# Patient Record
Sex: MALE | Race: BLACK OR AFRICAN AMERICAN | NOT HISPANIC OR LATINO | URBAN - METROPOLITAN AREA
[De-identification: names, ages, dates, MRNs, and addresses within clinical notes are randomized per-mention and may not be internally consistent; named-entity substitution may affect disease eponyms.]

---

## 2019-01-27 ENCOUNTER — EMERGENCY (EMERGENCY)
Facility: HOSPITAL | Age: 59
LOS: 1 days | Discharge: HOME | End: 2019-01-27
Attending: EMERGENCY MEDICINE | Admitting: EMERGENCY MEDICINE
Payer: MEDICAID

## 2019-01-27 VITALS — DIASTOLIC BLOOD PRESSURE: 112 MMHG | SYSTOLIC BLOOD PRESSURE: 218 MMHG

## 2019-01-27 VITALS
RESPIRATION RATE: 14 BRPM | HEART RATE: 71 BPM | SYSTOLIC BLOOD PRESSURE: 188 MMHG | DIASTOLIC BLOOD PRESSURE: 134 MMHG | OXYGEN SATURATION: 99 %

## 2019-01-27 PROCEDURE — 99283 EMERGENCY DEPT VISIT LOW MDM: CPT

## 2019-01-27 RX ORDER — AMLODIPINE BESYLATE 2.5 MG/1
10 TABLET ORAL ONCE
Qty: 0 | Refills: 0 | Status: COMPLETED | OUTPATIENT
Start: 2019-01-27 | End: 2019-01-27

## 2019-01-27 RX ORDER — AMLODIPINE BESYLATE 2.5 MG/1
1 TABLET ORAL
Qty: 7 | Refills: 0 | OUTPATIENT
Start: 2019-01-27 | End: 2019-02-02

## 2019-01-27 RX ORDER — POLYMYXIN B SULF/TRIMETHOPRIM 10000-1/ML
1 DROPS OPHTHALMIC (EYE) ONCE
Qty: 0 | Refills: 0 | Status: COMPLETED | OUTPATIENT
Start: 2019-01-27 | End: 2019-01-27

## 2019-01-27 RX ORDER — TETANUS TOXOID, REDUCED DIPHTHERIA TOXOID AND ACELLULAR PERTUSSIS VACCINE, ADSORBED 5; 2.5; 8; 8; 2.5 [IU]/.5ML; [IU]/.5ML; UG/.5ML; UG/.5ML; UG/.5ML
0.5 SUSPENSION INTRAMUSCULAR ONCE
Qty: 0 | Refills: 0 | Status: COMPLETED | OUTPATIENT
Start: 2019-01-27 | End: 2019-01-27

## 2019-01-27 RX ADMIN — AMLODIPINE BESYLATE 10 MILLIGRAM(S): 2.5 TABLET ORAL at 21:40

## 2019-01-27 RX ADMIN — TETANUS TOXOID, REDUCED DIPHTHERIA TOXOID AND ACELLULAR PERTUSSIS VACCINE, ADSORBED 0.5 MILLILITER(S): 5; 2.5; 8; 8; 2.5 SUSPENSION INTRAMUSCULAR at 21:06

## 2019-01-27 RX ADMIN — Medication 1 DROP(S): at 21:09

## 2019-01-27 NOTE — ED ADULT TRIAGE NOTE - CHIEF COMPLAINT QUOTE
Pt presents with c/o right eye discomfort, "it feels like gravels is inside". States he woke up with his symptom and denies any known exposure. Pt attempted to irrigate with eye wash without improvement to symptoms. Denies alteration to vision. pt did not take his antihypertensive medications today

## 2019-01-27 NOTE — ED PROVIDER NOTE - OBJECTIVE STATEMENT
59 y/o male with pmhx of HTN for past 2 years on no medications presents to ED c/o right eye redness, eyelid swelling and foreign body sensation since this morning. States he woke up this morning like he felt gravel was in his eye. Admits to mild photophobia. Bought eye wash saline at pharmacy with no relief. Has no PCP. Does not wear glasses. Denies fever, chills, vision changes, cp, sob, palpitations. 57 y/o male with pmhx of HTN for past 2 years on no medications presents to ED c/o right eye redness, minimal eyelid swelling and foreign body sensation since this morning. States he woke up this morning like he felt gravel was in his eye. Admits to mild photophobia. Bought eye wash saline at pharmacy with no relief. Has no PCP. Does not wear glasses. Denies fever, chills, vision changes, cp, sob, palpitations.

## 2019-01-27 NOTE — ED ADULT NURSE NOTE - OBJECTIVE STATEMENT
Pt received in room 13, a/o x4. Pt comes in for c/o right eye discomfort and continuous running water since earlier this evening. Pt reports that he felt something go in his eye and since then it has been running water. Pt went to pharmacy for eye solution and has had no relief, now here for evaluation. Pt appears in no acute distress, vs as noted and pt appears uncomfortable. Will monitor and await further orders.

## 2019-01-27 NOTE — ED PROVIDER NOTE - PROGRESS NOTE DETAILS
repeat blood pressure 220 systolic pt admits to feeling scared after receiving adacel shot. denies cp, sob, headache, weakness, numbness. will start pt on amlodipine for 2 weeks and close pcp follow up pt amenable with plan repeat blood pressure 220 systolic pt admits to feeling scared after receiving adacel shot. denies cp, sob, headache, weakness, numbness. will start pt on amlodipine for 1 week   and close pcp follow up pt amenable with plan repeat blood pressure 220 systolic pt admits to feeling scared after receiving adacel shot. repeat 215 systolic. denies cp, sob, headache, weakness, numbness. will start pt on amlodipine for 1 week   and close pcp follow up pt amenable with plan repeat blood pressure 220 systolic pt admits to feeling scared after receiving adacel shot. repeat 215 systolic. denies cp, sob, headache, weakness, numbness. will start pt on amlodipine for 1 week   and close pcp follow up pt amenable with plan discussed with Dr. Cee

## 2019-01-27 NOTE — ED PROVIDER NOTE - NSFOLLOWUPINSTRUCTIONS_ED_ALL_ED_FT
Follow up with Opthomology- call [phone]. Use Polytrim eye drops in right eye every 3 hours for 10 days. Use erythromycin ointment 1 application once daily at bedtime.     Follow up with Primary Care for uncontrolled high blood pressure.    Take Ibuprofen 400mg every 8 hours (take with food) as needed for pain.    Return to ER for any new or worsening symptoms, fever, chills, vision changes or any other concerns.

## 2019-01-27 NOTE — ED PROVIDER NOTE - EYES, MLM
Clear bilaterally, pupils equal, round and reactive to light. Left eye: minimal- modrate ey eswelling Clear bilaterally, pupils equal, round and reactive to light. Right eye: minimal eyelid swelling, no skin redness. Increased dye uptake with corneal abrasion. +medial scleral erythema

## 2019-01-27 NOTE — ED ADULT NURSE NOTE - NSIMPLEMENTINTERV_GEN_ALL_ED
Implemented All Universal Safety Interventions:  [place] to call system. Call bell, personal items and telephone within reach. Instruct patient to call for assistance. Room bathroom lighting operational. Non-slip footwear when patient is off stretcher. Physically safe environment: no spills, clutter or unnecessary equipment. Stretcher in lowest position, wheels locked, appropriate side rails in place.

## 2019-01-27 NOTE — ED PROVIDER NOTE - MEDICAL DECISION MAKING DETAILS
59 y/o male with pmhx of HTN for past 2 years on no medications presents to ED c/o right eye redness, minimal eyelid swelling and foreign body sensation since this morning. found with left eye corneal abrasion. plan-tetanus shot, polytrim eye drops, erythromycin at bedtime, optho outpt follow up. advised pcp follow up for uncontrolled HTN, will give referral list

## 2019-01-27 NOTE — ED PROVIDER NOTE - ATTENDING CONTRIBUTION TO CARE
Seen and examined, R eye redness, sl lid swelling, f.b. sensation No change in acuity, sl photophobia, worsened today. +fluoroscein uptake 4'oclock, no discharge, no surrounding redness. normal pharynx, clear lungs.

## 2025-01-27 NOTE — ED PROVIDER NOTE - CPE EDP ENMT NORM
normal... What Type Of Note Output Would You Prefer (Optional)?: Bullet Format Hpi Title: Evaluation of Skin Lesions Additional History: Patient reports pain when touching the lesions.